# Patient Record
Sex: MALE | Race: WHITE | Employment: FULL TIME | URBAN - METROPOLITAN AREA
[De-identification: names, ages, dates, MRNs, and addresses within clinical notes are randomized per-mention and may not be internally consistent; named-entity substitution may affect disease eponyms.]

---

## 2019-02-18 ENCOUNTER — HOSPITAL ENCOUNTER (OUTPATIENT)
Dept: ULTRASOUND IMAGING | Age: 49
Discharge: HOME | End: 2019-02-18
Attending: ORTHOPAEDIC SURGERY
Payer: COMMERCIAL

## 2019-02-18 DIAGNOSIS — M79.662 PAIN OF LEFT CALF: ICD-10-CM

## 2019-02-18 PROCEDURE — 93971 EXTREMITY STUDY: CPT

## 2022-10-13 ENCOUNTER — OFFICE VISIT (OUTPATIENT)
Dept: ORTHOPEDIC SURGERY | Age: 52
End: 2022-10-13
Payer: COMMERCIAL

## 2022-10-13 VITALS — HEIGHT: 69 IN | BODY MASS INDEX: 31.1 KG/M2 | WEIGHT: 210 LBS

## 2022-10-13 DIAGNOSIS — M19.172 POST-TRAUMATIC OSTEOARTHRITIS OF LEFT ANKLE: Primary | ICD-10-CM

## 2022-10-13 DIAGNOSIS — M25.372 ANKLE INSTABILITY, LEFT: ICD-10-CM

## 2022-10-13 DIAGNOSIS — M25.572 ACUTE LEFT ANKLE PAIN: ICD-10-CM

## 2022-10-13 DIAGNOSIS — M21.172 ACQUIRED VARUS DEFORMITY OF LEFT ANKLE: ICD-10-CM

## 2022-10-13 PROCEDURE — 99203 OFFICE O/P NEW LOW 30 MIN: CPT | Performed by: ORTHOPAEDIC SURGERY

## 2022-10-13 NOTE — PROGRESS NOTES
Keturah Lopez (: 1970) is a 47 y.o. male, patient,here for evaluation of the following   Chief Complaint   Patient presents with    Ankle Pain        ASSESSMENT/PLAN:  Below is the assessment and plan developed based on review of pertinent history, physical exam, labs, studies, and medications. 1. Post-traumatic osteoarthritis of left ankle  -     REFERRAL TO DME  -     MRI ANKLE LT WO CONT; Future  2. Ankle instability, left  3. Acquired varus deformity of left ankle  -     REFERRAL TO DME  -     MRI ANKLE LT WO CONT; Future  4. Acute left ankle pain  -     XR STANDING ANKLE LT MIN 3 V; Future  -     XR ANKLE STRESS V LT; Future  -     MRI ANKLE LT WO CONT; Future    Patient has instability to the ankle as there is over time the development of varus ankle position, we discussed the treatments for this persistent problem that is becoming a chronic problem for the ankle as the ankle has a tendency towards varus. He does not correct too much with the stress evaluation into neutral position, so this deformity is more rigid. He has increased space at the lateral aspect indicating significant lateral instability. Patient is informed of neck step in treatment which is to obtain some other studies to further evaluate this. Left ankle MRI without contrast is ordered today. A CT scan may also be necessary if he elects to proceed with surgery for preop planning. In the meantime we will use a soft brace for activities as tolerated. No rays needed next time. Return for Review MRI when completed treatment as indicated. Not on File    No current outpatient medications on file. No current facility-administered medications for this visit. History reviewed. No pertinent past medical history. History reviewed. No pertinent surgical history. No family history on file.     Social History     Socioeconomic History    Marital status:      Spouse name: Not on file    Number of children: Not on file    Years of education: Not on file    Highest education level: Not on file   Occupational History    Not on file   Tobacco Use    Smoking status: Not on file    Smokeless tobacco: Not on file   Substance and Sexual Activity    Alcohol use: Not on file    Drug use: Not on file    Sexual activity: Not on file   Other Topics Concern    Not on file   Social History Narrative    Not on file     Social Determinants of Health     Financial Resource Strain: Not on file   Food Insecurity: Not on file   Transportation Needs: Not on file   Physical Activity: Not on file   Stress: Not on file   Social Connections: Not on file   Intimate Partner Violence: Not on file   Housing Stability: Not on file           Vitals:  Ht 5' 9\" (1.753 m)   Wt 210 lb (95.3 kg)   BMI 31.01 kg/m²    Body mass index is 31.01 kg/m². SUBJECTIVE:  Mercedes Calle (: 1970)   Patient presents today with complaint of ankle pain for the past 3 months and there is some swelling present. States now his foot is also hurting. Patient has had no broken bones he can recall but he has had many sprains. In  he felt he had almost twisted his entire ankle to 90 degrees and had a lot of pain at that time. Over the years he has had minor sprains and he did have swelling. He has had histories of Planter fasciitis as well. Current pain is mild to moderate sharp stabbing pain that comes and goes and sometimes interferes with sleep. There is swelling present. Bending, twisting, squatting, stairs/steps, running walking make it worse. Patient states symptoms unchanged getting worse and he is tried rest.  He is also used a boot in the past.  He is not diabetic, non-smoker. OBJECTIVE EXAM:     Visit Vitals  Ht 5' 9\" (1.753 m)   Wt 210 lb (95.3 kg)   BMI 31.01 kg/m²       Appearance: Alert, well appearing and pleasant patient who is in no distress, oriented to person, place/time, and who follows commands.  This patient is accompanied in the       office by his  self. Psychiatric: Affect and mood are appropriate. No dementia noted on examination  Musculoskeletal:  LOCATION: Discomfort around the ankle joint area leg - left      Integumentary: No rashes, skin patches, wounds, or abrasions to the right or left legs       Warm and normal color. No regions of expressible drainage. Gait: Normal      Tenderness: No tenderness        Motor/Strength/Tone Exam: Normal       Sensory Exam:   Intact Normal Sensation to ankle/foot      Stability Testing: No anterolateral or varus instability of the Ankle or Subtalar Joints               No peroneal tendon instability noted      ROM: Normal ROM noted to ankle/foot      Contractures: No Achilles or Gastrocnemius Contractures      Calf tenderness: Absent for calf or gastrocnemius muscle regions       Soft, supple, non tender, non taut lower extremity compartment  Alignment:      NEUTRAL Hindfoot,         none Metatarsus Adductus Metatarsus   Wounds/Abrasions:    None present  Extremities:   No embolic phenomena to the toes          No significant edema to the foot and or toes. Lower extremities are warm and appear well perfused    DVT: No evidence of DVT seen on examination at this time     No calf swelling, no tenderness to calf muscles  Lymphatic:  No Evidence of Lymphedema  Vascular: Medial Border of Tibia Region: Edema is not present         Pulses: Dorsalis Pedis &  Posterior Tibial Pulses : Palpable yes        Varicosities Lower Limbs :  None  noted  Neuro: Negative bilateral Straight leg raise (seated position)    See Musculoskeletal section for pertinent individual extremity examination    No abnormal hand/wrist, foot/ankle, or facial/neck tremors. Lower Extremity/Ankle/Foot:  Antalgic gait, satisfactory weightbearing stance. Left lower extremity/ankle:  There is tenderness to palpation around the ankle joint area, there is a tendency towards varus at the ankle but the hindfoot is neutral position, there is limited range of motion with dorsiflexion plantarflexion. The ankle is unstable for lateral talar tilt stress, anterior drawer is 1+. There is also mild tenderness along the peroneal tendons. Achilles tendon intact with a negative Torres test, negative ankle squeeze test.    Left foot: There is no malalignment or deformity, nontender, no swelling, ligaments grossly stable. Contralateral lower extremity/ankle /foot exam:  Nontender, no swelling ligaments grossly stable. Normal weightbearing stance. Neurovascular exam is intact for light touch sensation, cap refill, flexion/extension tone ankle strength 5/5. Imaging:  XR Results (maximum last 2): Results from Appointment encounter on 10/13/22    XR ANKLE STRESS V LT    Narrative  Stress x-rays of the left ankle. Contralateral show on the patient's chart shows increased instability lateral talus, correction medially there is some stiffness of the joint not recreating a full correction of joint. In comparison the right ankle also has some instability. XR STANDING ANKLE LT MIN 3 V    Narrative  Left ankle AP, lateral and oblique standing x-rays show varus position to the talus with arthritis at the joint, on lateral view shows the same, there is anterior bone spur at the talar neck indicating instability. An electronic signature was used to authenticate this note.   -- Nima Tobias MD

## 2022-10-13 NOTE — LETTER
10/17/2022    Patient: Marvin Buckner   YOB: 1970   Date of Visit: 10/13/2022     Nanette Rooney MD  [address] [address]  Via Fax: [phone]    Dear Nanette Rooney MD,      Thank you for referring Mr. Marvin Buckner to [facility] for evaluation. My notes for this consultation are attached. If you have questions, please do not hesitate to call me. I look forward to following your patient along with you.       Sincerely,    Humaira Parks MD

## 2022-10-25 ENCOUNTER — OFFICE VISIT (OUTPATIENT)
Dept: ORTHOPEDIC SURGERY | Age: 52
End: 2022-10-25
Payer: COMMERCIAL

## 2022-10-25 VITALS — BODY MASS INDEX: 31.1 KG/M2 | WEIGHT: 210 LBS | HEIGHT: 69 IN

## 2022-10-25 DIAGNOSIS — M21.172 ACQUIRED VARUS DEFORMITY OF LEFT ANKLE: ICD-10-CM

## 2022-10-25 DIAGNOSIS — M19.172 POST-TRAUMATIC OSTEOARTHRITIS OF LEFT ANKLE: Primary | ICD-10-CM

## 2022-10-25 DIAGNOSIS — M25.572 ACUTE LEFT ANKLE PAIN: ICD-10-CM

## 2022-10-25 DIAGNOSIS — M25.372 ANKLE INSTABILITY, LEFT: ICD-10-CM

## 2022-10-25 PROCEDURE — 99213 OFFICE O/P EST LOW 20 MIN: CPT | Performed by: ORTHOPAEDIC SURGERY

## 2022-10-25 NOTE — LETTER
10/31/2022    Patient: Marycruz Clarke   YOB: 1970   Date of Visit: 10/25/2022     Marisol Handley MD  [address] [facility] [place] [address]  Via Fax: [phone]    Dear Marisol Handley MD,      Thank you for referring Mr. Marycruz Clarke to [facility] for evaluation. My notes for this consultation are attached. If you have questions, please do not hesitate to call me. I look forward to following your patient along with you.       Sincerely,    Philly Bautista MD

## 2022-10-25 NOTE — PROGRESS NOTES
Kalani Ornelas (: 1970) is a 47 y.o. male, patient,here for evaluation of the following   Chief Complaint   Patient presents with    Ankle Pain    Follow-up        ASSESSMENT/PLAN:  Below is the assessment and plan developed based on review of pertinent history, physical exam, labs, studies, and medications. 1. Post-traumatic osteoarthritis of left ankle  2. Acquired varus deformity of left ankle  3. Ankle instability, left  4. Acute left ankle pain    Patient is informed of findings on the MRI obtained, he does have moderate tibiotalar arthritis most at the medial joint articulation and less at the lateral, not unusual be due to the underlying acquired varus deformity. We had discussed the possibility of addressing the acquired deformity first prior to addressing the arthritis. I would likely address arthritis initially with arthroscopic debridement and lateral ligament reconstruction with tendon graft was what I recommended. After further discussion he had many questions and all were answered, I also offered maybe another opinion so he can get better watch clear information about what to expect. Somewhere down the line he may be candidate for an ankle replacement but the deformity would have to be addressed prior to doing that, some of that could also be based on the cuts made but the lateral ligament should be stable for an ankle replacement in the future. I did provide a name of another orthopedic foot and ankle specialist to get another opinion from, patient will decide what he wants to do going forward, in the meantime recommend activity modification, brace if needed to avoid recurrent lateral sprains or inversion injuries. Return if symptoms worsen or fail to improve. Not on File    No current outpatient medications on file. No current facility-administered medications for this visit. No past medical history on file. No past surgical history on file.     No family history on file.    Social History     Socioeconomic History    Marital status:      Spouse name: Not on file    Number of children: Not on file    Years of education: Not on file    Highest education level: Not on file   Occupational History    Not on file   Tobacco Use    Smoking status: Not on file    Smokeless tobacco: Not on file   Substance and Sexual Activity    Alcohol use: Not on file    Drug use: Not on file    Sexual activity: Not on file   Other Topics Concern    Not on file   Social History Narrative    Not on file     Social Determinants of Health     Financial Resource Strain: Not on file   Food Insecurity: Not on file   Transportation Needs: Not on file   Physical Activity: Not on file   Stress: Not on file   Social Connections: Not on file   Intimate Partner Violence: Not on file   Housing Stability: Not on file           Vitals:  Ht 5' 9\" (1.753 m)   Wt 210 lb (95.3 kg)   BMI 31.01 kg/m²    Body mass index is 31.01 kg/m². SUBJECTIVE:  Troy Guzman (: 1970)   Patient back today to review the MRI without contrast obtained to evaluate the amount of arthritis at the joint and also for evaluation of ligaments, there is lateral instability with acquired hindfoot varus and arthritis at the joints. No new complaints. OBJECTIVE EXAM:     Visit Vitals  Ht 5' 9\" (1.753 m)   Wt 210 lb (95.3 kg)   BMI 31.01 kg/m²       Appearance: Alert, well appearing and pleasant patient who is in no distress, oriented to person, place/time, and who follows commands. This patient is accompanied in the       office by his  self. Psychiatric: Affect and mood are appropriate. No dementia noted on examination  Musculoskeletal:  LOCATION: Diffuse tenderness to palpation lateral and anterior medial ankle - left      Integumentary: No rashes, skin patches, wounds, or abrasions to the right or left legs       Warm and normal color. No regions of expressible drainage.       Gait: Normal      Tenderness: No tenderness        Motor/Strength/Tone Exam: Normal       Sensory Exam:   Intact Normal Sensation to ankle/foot      Stability Testing: No anterolateral or varus instability of the Ankle or Subtalar Joints               No peroneal tendon instability noted      ROM: Normal ROM noted to ankle/foot      Contractures: No Achilles or Gastrocnemius Contractures      Calf tenderness: Absent for calf or gastrocnemius muscle regions       Soft, supple, non tender, non taut lower extremity compartment  Alignment:      NEUTRAL Hindfoot,         none Metatarsus Adductus Metatarsus   Wounds/Abrasions:    None present  Extremities:   No embolic phenomena to the toes          No significant edema to the foot and or toes. Lower extremities are warm and appear well perfused    DVT: No evidence of DVT seen on examination at this time     No calf swelling, no tenderness to calf muscles  Lymphatic:  No Evidence of Lymphedema  Vascular: Medial Border of Tibia Region: Edema is not present         Pulses: Dorsalis Pedis &  Posterior Tibial Pulses : Palpable yes        Varicosities Lower Limbs :  None  noted  Neuro: Negative bilateral Straight leg raise (seated position)    See Musculoskeletal section for pertinent individual extremity examination    No abnormal hand/wrist, foot/ankle, or facial/neck tremors. Lower Extremity/Ankle/Foot:  Mild antalgic gait, satisfactory weightbearing stance. Left lower extremity/ankle: There is acquired varus position to the ankle unchanged, tenderness to palpation anterior medial ankle joint, lateral around the lateral ligaments, 1+ anterior drawer. Achilles tendon intact, negative Torres test, negative ankle squeeze test.  There is some stiffness to attempted dorsiflexion and plantarflexion, secondary arthritis    Left foot: Hindfoot position secondary to ankle varus deformity, otherwise mostly nontender, no swelling, ligament stable.     Contralateral lower extremity/ankle /foot exam: Nontender, no swelling ligaments grossly stable. Normal weightbearing stance. Neurovascular exam grossly intact. Imaging:      No x-rays were obtained today, reviewed MRI without contrast dated October 19, 2022, this is reviewed in detail and it shows the tenosynovitis of the posterior tibial tendon, mild also at the peroneal tendons. The lateral ligaments appear thick and irregular late related to old injuries. There is also a partial tear of the deltoid ligament which is a chronic problem. There is moderate tibiotalar osteoarthritis most at the medial aspect. There is slight internal rotation of the talus as it sits on the tibial plafond. Detailed radiology report is also reviewed, the summary below. IMPRESSION  1. Moderate tibiotalar osteoarthritis with greater changes medially. There is  internal rotation of the talus in relation to the tibial plafond. 2. Significant thickening and irregularity of the ligaments of the lateral side  the ankle likely related to prior injury/degeneration. 3. Partial tearing of the deltoid ligament complex which may be chronic. There  is a synovial cyst that extends anterior medially from the deltoid ligament  complex. 4. Mild posterior tibial tenosynovitis. 5. Mild peroneal tenosynovitis. 6. Moderate osteoarthritis in the midfoot. An electronic signature was used to authenticate this note.   -- Henry Gleason MD

## 2023-01-26 ENCOUNTER — OFFICE VISIT (OUTPATIENT)
Dept: ORTHOPEDIC SURGERY | Age: 53
End: 2023-01-26
Payer: COMMERCIAL

## 2023-01-26 DIAGNOSIS — M25.572 ACUTE LEFT ANKLE PAIN: ICD-10-CM

## 2023-01-26 DIAGNOSIS — M25.372 ANKLE INSTABILITY, LEFT: Primary | ICD-10-CM

## 2023-01-26 NOTE — PROGRESS NOTES
Ariela Gee (: 1970) is a 47 y.o. male patient here for evaluation of the following chief complaint(s):  No chief complaint on file. Patient Name: Ariela Gee  Date:2023  : 1970  [x]  Patient  Verified  Payor: Harper Mccray / Plan: [facility] HMO/CHOICE PLUS/POS / Product Type: HMO /    In time: 2:05  Out time:3:05  Total Treatment Time (min): 60  Total Timed Codes (min): 50  1:1 Treatment Time (MC only): NA   Visit #: 1 of 60      SUBJECTIVE/OBJECTIVE:  HPI    The patient is a 47 year male presenting with left ankle pain and swelling, instability. Recurring symptoms since . The patient is referred by Naida Long MD.   The patient has a longstanding history of left ankle pain, torn deltoid ligament and lateral ligaments, and osteoarthritis/bone spur. He had all of this repaired on 2022. Surgeon Dr. Beth Mitchell at [facility]. Last week he was taken out of the cast and has since been wearing a walking boot, he uses crutches for support. His next follow-up appoint with the surgeon is . He does have intermittent pain and quite a bit of stiffness since the operation. 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 His long-term goals are to get back to biking on the road, hiking, and coaching his baseball team.  He has 2 daughters. Physical Exam    Ankle Exam:    Range of motion: on left  Dorsiflexion- -10 degrees  Plantarflexion-  50 degrees  Subtalar inversion/Eversion- limited  Great toe ext- limited    *will assess range on right next visit    Strength: on right  DF- 5/5  PF- 5/5  Inv/Ev- 5/5    Strength: on left  DF- 5/5  PF- 5/5  Inv/Ev- 5/5    Calf raises- will assess once appropriate  Squatting- NT today  SL balance- NT today    Flexibility:   Hamstring- moderately restricted bilaterally  Gastroc-soleus-  moderately restricted bilaterally    Soft tissue:  Moderate restriction is noted of the gastroc-soleus complex, plantar fascia. Joint mobility:  TC joint- hypomobile, into ankle dorsiflexion, plantarflexion inversion and eversion  Subtalar joint- hypomobile into subtalar eversion  Midfoot-stiffness, swelling present  1st MTP-mild valgus, mild limitation into extension    Pain: Reported a visual analog scale:  0-3/10;      Gait: patient is currently wearing a walking boot with use of crutches for support    FAAM: 30%      Therapeutic exercise performed: Seated slides into dorsiflexion and eversion, plantarflexion stretch, calf stretch, calf massage, and gait practice. 20 min    Manual: Gastrocsoleus STM, AP glides, stretching into eversion dorsiflexion and plantarflexion. 10 min. Game ready ice machine for 10 minutes. An electronic signature was used to authenticate this note. -- Pool Ceja, PT       ASSESSMENT/PLAN:  Below is the assessment and plan developed based on review of pertinent history, physical exam, labs, studies, and medications. 1. Ankle instability, left  2. Acute left ankle pain      No follow-ups on file. Ky Lopez is a 47 y.o. M presenting status post deltoid ligament and ATFL repair on 12/8/2022. He currently has the following impairments:  moderately restricted gastroc-soleus complex and plantar fascia on the left, pain with palpation of the Achilles/gastroc region on the left, increased pain with walking, standing, balancing, and squatting; loss of great toe mobility and ankle dorsiflexion range of motion on the left; inability to rise on his toes; and inability to perform daily and recreational activity including distance walking. He would benefit from skilled physical therapy services in order to address the above impairments to allow for return to daily and recreational activity. We reviewed a home program and provided a print out. The patient verbalized understanding of the home program and POC.  The patient will follow up in the clinic 2x/week for 4-6 weeks to address the above impairments and work towards a home program.      Goals  to achieve in 4-6 weeks. 1.  Increase ankle dorsiflexion by 10-15°, bilaterally, to aid in walking and stair ability  2. Increase tolerance to single leg balancing with performance of single-leg stance for greater than 10-20 seconds without pain/instability  3. The patient will be able to walk without the boot for 20 minutes, proper gait mechanics  4. The patient will be able to walk up and down 1 flight of stairs with adequate dorsiflexion mobility  5. The patient will be independent with a home exercise program  6. The patient will be able to rise up on his toes      An electronic signature was used to authenticate this note.   -- Susie Lieberman, PT

## 2023-01-31 ENCOUNTER — OFFICE VISIT (OUTPATIENT)
Dept: ORTHOPEDIC SURGERY | Age: 53
End: 2023-01-31
Payer: COMMERCIAL

## 2023-01-31 DIAGNOSIS — M25.372 ANKLE INSTABILITY, LEFT: ICD-10-CM

## 2023-01-31 DIAGNOSIS — M25.572 ACUTE LEFT ANKLE PAIN: Primary | ICD-10-CM

## 2023-01-31 PROCEDURE — 97110 THERAPEUTIC EXERCISES: CPT | Performed by: PHYSICAL THERAPIST

## 2023-01-31 PROCEDURE — 97140 MANUAL THERAPY 1/> REGIONS: CPT | Performed by: PHYSICAL THERAPIST

## 2023-02-03 ENCOUNTER — OFFICE VISIT (OUTPATIENT)
Dept: ORTHOPEDIC SURGERY | Age: 53
End: 2023-02-03
Payer: COMMERCIAL

## 2023-02-03 DIAGNOSIS — M25.572 ACUTE LEFT ANKLE PAIN: Primary | ICD-10-CM

## 2023-02-03 DIAGNOSIS — M25.372 ANKLE INSTABILITY, LEFT: ICD-10-CM

## 2023-02-03 NOTE — PROGRESS NOTES
Ismael George (: 1970) is a 47 y.o. male patient here for evaluation of the following chief complaint(s): Ankle Pain       Patient Name: Ismael George  Date:2/3/2023  : 1970  [x]  Patient  Verified  Payor: Michaele Fabry [facility] / Product Type: HMO /    In time: 1:20  Out time: 2:35  Total Treatment Time (min): 75  Total Timed Codes (min): 60  1:1 Treatment Time (MC only): NA   Visit #: 2 of 60      ASSESSMENT/PLAN:  Below is the assessment and plan developed based on review of pertinent history, physical exam, labs, studies, and medications. 1. Acute left ankle pain  2. Ankle instability, left    Return in about 1 week (around 2/10/2023). Patient is now almost 2 months from operation. Surgery on 2022. We are continuing to progress his range of motion, he is quite limited into ankle dorsiflexion and inversion range. Instructed on updated exercises to work on daily, he will follow-up next week and we will continue to progress out of the walking boot once able. SUBJECTIVE/OBJECTIVE:  HPI    Patient reports an improvement since doing the exercises. His ankle is stiff with movements. He is feeling better walking without the crutches in his walking boot. Physical Exam    Objective:    Range of motion: on left  Dorsiflexion- almost neutral  Plantarflexion-  50 degrees  Subtalar inversion/Eversion- limited  Great toe ext- limited    Function/Strength: improvement in tolerance to ankle dorsiflexion range. Able to walk in the boot without crutches today. Manual performed: Left ankle mobilization into dorsiflexion, plantarflexion and subtalar eversion; soft tissue release gastrocsoleus and anterior shin. Great toe stretch and massage. 20 min.     Therapeutic exercise performed: Knee to wall stretch, gait practice, seated slides into inversion, eversion, and dorsiflexion, calf stretch, plantarflexion stretch, toe stretch, subtalar mobilization, band plantarflexion, DF, and eversion. 40 minutes. Patient finished with the Game ready for 15 minutes. An electronic signature was used to authenticate this note.   -- Harinder Dallas, PT

## 2023-02-07 ENCOUNTER — OFFICE VISIT (OUTPATIENT)
Dept: ORTHOPEDIC SURGERY | Age: 53
End: 2023-02-07
Payer: COMMERCIAL

## 2023-02-07 DIAGNOSIS — M25.572 ACUTE LEFT ANKLE PAIN: Primary | ICD-10-CM

## 2023-02-07 DIAGNOSIS — M25.372 ANKLE INSTABILITY, LEFT: ICD-10-CM

## 2023-02-07 PROCEDURE — 97110 THERAPEUTIC EXERCISES: CPT | Performed by: PHYSICAL THERAPIST

## 2023-02-07 PROCEDURE — 97140 MANUAL THERAPY 1/> REGIONS: CPT | Performed by: PHYSICAL THERAPIST

## 2023-02-07 NOTE — PROGRESS NOTES
Siria Tyler (: 1970) is a 47 y.o. male patient here for evaluation of the following chief complaint(s): Ankle Pain       Patient Name: Siria Tyler  Date:2023  : 1970  [x]  Patient  Verified  Payor: Elias Maciel / [facility] / Product Type: HMO /    In time: 2:20  Out time: 3:30  Total Treatment Time (min): 70  Total Timed Codes (min): 60  1:1 Treatment Time (MC only): NA   Visit #: 3  60      ASSESSMENT/PLAN:  Below is the assessment and plan developed based on review of pertinent history, physical exam, labs, studies, and medications. 1. Acute left ankle pain  2. Ankle instability, left    Return in about 2 days (around 2023). Patient is now 2 months from operation. Surgery on 2022. We are continuing to progress his range of motion, he is quite limited into ankle dorsiflexion range. Today he was able to progress out of the walking boot to practice his gait mechanics. Uses a crutch as needed for balance. Instructed on updated exercises to work on daily, he will follow-up next week and we will continue to progress towards full range and proper gait mechanics. SUBJECTIVE/OBJECTIVE:  HPI    Patient reports he continues to work on his range of motion at home. Feels that the boot is limiting his range. Physical Exam    Objective:    Range of motion : on left  Dorsiflexion- slightly past neutral   Plantarflexion-  50 degrees  Subtalar inversion/Eversion- limited  Great toe ext- limited    Function/Strength: improvement in tolerance to ankle dorsiflexion range. Progressed to practicing gait without the boot. Manual performed: Left ankle mobilization into dorsiflexion, plantarflexion and subtalar eversion; soft tissue release gastrocsoleus and anterior shin. Great toe stretch and massage. Ankle mob into DF in 1/2 kneeling. 20 min.     Therapeutic exercise performed: Knee to wall stretch, gait practice/cone walks, seated slides into inv/ev/DF, calf stretch, plantarflexion stretch, toe stretch, subtalar mobilization, band 3 way, ankle mob DF with band. 40 minutes. Patient finished with the Game ready for 10 minutes. An electronic signature was used to authenticate this note.   -- Ho Cyr, PT

## 2023-02-09 ENCOUNTER — OFFICE VISIT (OUTPATIENT)
Dept: ORTHOPEDIC SURGERY | Age: 53
End: 2023-02-09
Payer: COMMERCIAL

## 2023-02-09 DIAGNOSIS — M25.372 ANKLE INSTABILITY, LEFT: ICD-10-CM

## 2023-02-09 DIAGNOSIS — M25.572 ACUTE LEFT ANKLE PAIN: Primary | ICD-10-CM

## 2023-02-09 NOTE — PROGRESS NOTES
Darnell Munoz (: 1970) is a 47 y.o. male patient here for evaluation of the following chief complaint(s): Ankle Pain       Patient Name: Darnell Munoz  Date:2023  : 1970  [x]  Patient  Verified  Payor: Angi Centenojazzy / Plan: [address] HMO/CHOICE PLUS/POS / Product Type: HMO /    In time: 3:40  Out time: 4:55  Total Treatment Time (min): 75  Total Timed Codes (min): 60  1:1 Treatment Time (MC only): NA   Visit #: 4  60      ASSESSMENT/PLAN:  Below is the assessment and plan developed based on review of pertinent history, physical exam, labs, studies, and medications. 1. Acute left ankle pain  2. Ankle instability, left    Return in about 5 days (around 2023). Patient is now 2 months from operation. Surgery on 2022. We are continuing to progress his range of motion, he is quite limited into ankle dorsiflexion range. Today he was able to progress out of the walking boot to practice his gait mechanics, he is improving with this. Uses a crutch as needed for balance. Instructed on updated exercises to work on daily, he will follow-up next week and we will continue to progress towards full range and proper gait mechanics. SUBJECTIVE/OBJECTIVE:  HPI    Patient reports he continues to work on his range of motion at home and while at work. He is doing well practicing his gait out of the boot. Physical Exam    Objective:    Range of motion : on left  Dorsiflexion- slightly past neutral   Plantarflexion-  50 degrees  Subtalar inversion/Eversion- limited  Great toe ext- limited    Function/Strength: improvement in tolerance to ankle dorsiflexion range. Progressed to practicing gait without the boot. Manual performed: Left ankle mobilization into dorsiflexion, plantarflexion and subtalar eversion; soft tissue release gastrocsoleus and anterior shin. Great toe stretch and massage. 20 min.     Therapeutic exercise performed: Knee to wall stretch, gait practice/cone walks, seated slides into inv/ev/DF, calf stretch, calf raises, squats, subtalar mobilization, band 3 way, ankle mob DF with band. 40 minutes. Patient finished with the Game ready for 10 minutes. An electronic signature was used to authenticate this note.   -- Trice Gonzales, PT

## 2023-02-13 ENCOUNTER — OFFICE VISIT (OUTPATIENT)
Dept: ORTHOPEDIC SURGERY | Age: 53
End: 2023-02-13
Payer: COMMERCIAL

## 2023-02-13 DIAGNOSIS — M25.572 ACUTE LEFT ANKLE PAIN: Primary | ICD-10-CM

## 2023-02-13 DIAGNOSIS — M25.372 ANKLE INSTABILITY, LEFT: ICD-10-CM

## 2023-02-13 PROCEDURE — 97140 MANUAL THERAPY 1/> REGIONS: CPT | Performed by: PHYSICAL THERAPIST

## 2023-02-13 PROCEDURE — 97110 THERAPEUTIC EXERCISES: CPT | Performed by: PHYSICAL THERAPIST

## 2023-02-13 NOTE — PROGRESS NOTES
Dayne Ponce (: 1970) is a 47 y.o. male patient here for evaluation of the following chief complaint(s): Ankle Pain       Patient Name: Dayne Ponce  Date:2023  : 1970  [x]  Patient  Verified  Payor: Jammie Aviles / Plan: [id number] HMO/CHOICE PLUS/POS / Product Type: HMO /    In time: 2:00  Out time: 3:15  Total Treatment Time (min): 75  Total Timed Codes (min): 60  1:1 Treatment Time (MC only): NA   Visit #: 5 of 60      ASSESSMENT/PLAN:  Below is the assessment and plan developed based on review of pertinent history, physical exam, labs, studies, and medications. 1. Acute left ankle pain  2. Ankle instability, left    Return in about 3 days (around 2023). Patient is now over 2 months from operation. Surgery on 2022. We are continuing to progress his range of motion, he is quite limited into ankle dorsiflexion range. Today we added lateral step downs for DF range. Instructed on updated exercises to work on daily, he will follow-up next week and we will continue to progress towards full range and proper gait mechanics. SUBJECTIVE/OBJECTIVE:  HPI    Patient reports he continues to work on his range of motion at home. He's been practicing his gait out of the boot. Physical Exam    Objective:    Range of motion : on left  Dorsiflexion- slightly past neutral   Plantarflexion-  50 degrees  Subtalar inversion/Eversion- limited  Great toe ext- limited    Function/Strength: improvement in tolerance to ankle dorsiflexion range, gait mechanics     Manual performed: Left ankle mobilization into dorsiflexion, plantarflexion and subtalar eversion; soft tissue release gastrocsoleus and anterior shin. Great toe stretch and massage. 20 min.     Therapeutic exercise performed: Knee to wall stretch, gait practice/cone walks, seated slides into inv/ev/DF, calf stretch, calf raises, squats, subtalar mobilization, lateral heel taps, ankle distraction, ankle mob DF with band. 40 minutes. Patient finished with ice for 10 minutes. An electronic signature was used to authenticate this note.   -- Yolanda Damon, PT

## 2023-02-16 ENCOUNTER — OFFICE VISIT (OUTPATIENT)
Dept: ORTHOPEDIC SURGERY | Age: 53
End: 2023-02-16
Payer: COMMERCIAL

## 2023-02-16 DIAGNOSIS — M25.572 ACUTE LEFT ANKLE PAIN: Primary | ICD-10-CM

## 2023-02-16 DIAGNOSIS — M25.372 ANKLE INSTABILITY, LEFT: ICD-10-CM

## 2023-02-16 NOTE — PROGRESS NOTES
Perry Zhao (: 1970) is a 47 y.o. male patient here for evaluation of the following chief complaint(s): Ankle Pain       Patient Name: Perry Zhao  Date:2023  : 1970  [x]  Patient  Verified  Payor: Kathe Fuelling / Plan: [employer] HMO/CHOICE PLUS/POS / Product Type: HMO /    In time: 3:00  Out time: 4:15  Total Treatment Time (min): 75  Total Timed Codes (min): 60  1:1 Treatment Time (MC only): NA   Visit #: 6  60      ASSESSMENT/PLAN:  Below is the assessment and plan developed based on review of pertinent history, physical exam, labs, studies, and medications. 1. Acute left ankle pain  2. Ankle instability, left    Return in about 4 days (around 2023). Patient is now over 2 months from operation. Surgery on 2022. We are continuing to progress his range of motion, he is quite limited into ankle dorsiflexion range. Today we added lateral and forward step downs for DF range. Instructed on updated exercises to work on daily, he will follow-up next week and we will continue to progress towards full range and proper gait mechanics. 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 Slow progress. Physical Exam    Objective:    Range of motion /7: on left  Dorsiflexion- slightly past neutral   Plantarflexion-  50 degrees  Subtalar inversion/Eversion- limited  Great toe ext- limited    Function/Strength: improvement in tolerance to ankle dorsiflexion range, gait mechanics     Manual performed: Left ankle mobilization into dorsiflexion, plantarflexion and subtalar eversion; soft tissue release gastrocsoleus and anterior shin. Great toe stretch. 20 min.     Therapeutic exercise performed: Knee to wall stretch, gait practice/cone walks, seated slides into inv/ev/DF, calf stretch, squats, subtalar mobilization, lateral and forward heel taps, ankle distraction, ankle mob DF with band. 40 minutes. Patient finished with Game ready for 10 minutes. An electronic signature was used to authenticate this note.   -- Chung Balbuena, PT

## 2023-02-20 ENCOUNTER — OFFICE VISIT (OUTPATIENT)
Dept: ORTHOPEDIC SURGERY | Age: 53
End: 2023-02-20
Payer: COMMERCIAL

## 2023-02-20 DIAGNOSIS — M25.372 ANKLE INSTABILITY, LEFT: ICD-10-CM

## 2023-02-20 DIAGNOSIS — M25.572 ACUTE LEFT ANKLE PAIN: Primary | ICD-10-CM

## 2023-02-20 PROCEDURE — 97110 THERAPEUTIC EXERCISES: CPT | Performed by: PHYSICAL THERAPIST

## 2023-02-20 PROCEDURE — 97140 MANUAL THERAPY 1/> REGIONS: CPT | Performed by: PHYSICAL THERAPIST

## 2023-02-23 ENCOUNTER — OFFICE VISIT (OUTPATIENT)
Dept: ORTHOPEDIC SURGERY | Age: 53
End: 2023-02-23
Payer: COMMERCIAL

## 2023-02-23 DIAGNOSIS — M25.572 ACUTE LEFT ANKLE PAIN: Primary | ICD-10-CM

## 2023-02-23 DIAGNOSIS — M25.372 ANKLE INSTABILITY, LEFT: ICD-10-CM

## 2023-02-23 NOTE — PROGRESS NOTES
Collette Molina (: 1970) is a 47 y.o. male patient here for evaluation of the following chief complaint(s): Ankle Pain       Patient Name: Collette Molina  Date:2023  : 1970  [x]  Patient  Verified  Payor: Kurt Ball / Plan: [address] [employer] HMO/CHOICE PLUS/POS / Product Type: HMO /    In time: 2:35  Out time: 3:50  Total Treatment Time (min): 75  Total Timed Codes (min): 60  1:1 Treatment Time (MC only): NA   Visit #: 10 of 60      ASSESSMENT/PLAN:  Below is the assessment and plan developed based on review of pertinent history, physical exam, labs, studies, and medications. 1. Acute left ankle pain  2. Ankle instability, left    Return in about 5 days (around 2023). Patient is now 2 1/2 months from operation. Surgery on 2022. We are continuing to progress his range of motion, he is still limited into ankle dorsiflexion range. His walking is improving each week. Instructed on updated exercises to work on daily, he will follow-up next week and we will continue to progress towards full range and proper gait mechanics. SUBJECTIVE/OBJECTIVE:  HPI    Patient reports his ankle was swollen starting Monday night. He is starting to  baseball but wears his walking boot. Physical Exam    Objective:    Range of motion : on left  Dorsiflexion- slightly past neutral   Plantarflexion-  50 degrees  Subtalar inversion/Eversion- limited  Great toe ext- limited    Function/Strength: improvement in tolerance to ankle dorsiflexion range, gait mechanics     Manual performed: Left ankle mobilization into dorsiflexion, plantarflexion and subtalar eversion; soft tissue release gastrocsoleus and anterior shin, ankle mob dorsiflexion in 1/2 kneeling. Great toe stretch. 20 min. Therapeutic exercise performed: Knee to wall stretch, calf raises, band walks, calf stretch, squats, ankle distraction, ankle mob DF and PF with band. 40 minutes.       Patient finished with Game ready for 10 minutes. An electronic signature was used to authenticate this note.   -- Hazel Villalobos, PT

## 2023-02-28 ENCOUNTER — OFFICE VISIT (OUTPATIENT)
Dept: ORTHOPEDIC SURGERY | Age: 53
End: 2023-02-28

## 2023-02-28 DIAGNOSIS — M25.572 ACUTE LEFT ANKLE PAIN: Primary | ICD-10-CM

## 2023-02-28 DIAGNOSIS — M25.372 ANKLE INSTABILITY, LEFT: ICD-10-CM

## 2023-02-28 NOTE — PROGRESS NOTES
Ben Lopez (: 1970) is a 47 y.o. male patient here for evaluation of the following chief complaint(s): Ankle Pain       Patient Name: Ben Lopez  Date:2023  : 1970  [x]  Patient  Verified  Payor: Jennifer Boy / Plan: [address] [employer] HMO/CHOICE PLUS/POS / Product Type: HMO /    In time: 2:10  Out time: 3:30  Total Treatment Time (min): 80  Total Timed Codes (min): 60  1:1 Treatment Time (MC only): NA   Visit #: 10 of 60      ASSESSMENT/PLAN:  Below is the assessment and plan developed based on review of pertinent history, physical exam, labs, studies, and medications. 1. Acute left ankle pain  2. Ankle instability, left    Return in about 2 days (around 3/2/2023). Patient is now 12 weeks from operation. Surgery on 2022. We are continuing to progress his range of motion, he is still limited into ankle dorsiflexion and plantarflexion range. His walking is improving each week and we are progressing to greater weight bearing activity including stairs, calf raises, and squats. Instructed on updated exercises to work on daily, he will follow-up later this week and we will continue to progress towards full range and proper gait mechanics. SUBJECTIVE/OBJECTIVE:  HPI    Patient reports the swelling went down since the last visit. He stretches daily. Physical Exam    Objective:    Range of motion : on left  KTW dorsiflexion- on right- 5 degrees  on left- 16 degrees  Plantarflexion-  not measured, improving  Ankle inversion/eversion- improving  Great toe ext- NT today    Function/Strength: improvement in tolerance to ankle dorsiflexion range, mild shift off the left LE in stance phase and mild loss of DF range in stance    Manual performed: Left ankle mobilization into dorsiflexion, plantarflexion and subtalar eversion; ankle distraction; soft tissue release gastrocsoleus and anterior shin, ankle mob dorsiflexion in 1/2 kneeling. Great toe stretch.  21 min.    Therapeutic exercise performed: Knee to wall stretch, calf raise shifts, band walks, calf stretch, squats, ankle distraction, plantarflexion stretch in sitting, lateral and forward heel taps. 45 minutes. Patient finished with Game ready for 10 minutes. An electronic signature was used to authenticate this note.   -- Radha Shook, PT

## 2023-03-08 ENCOUNTER — OFFICE VISIT (OUTPATIENT)
Dept: ORTHOPEDIC SURGERY | Age: 53
End: 2023-03-08

## 2023-03-08 DIAGNOSIS — M25.572 ACUTE LEFT ANKLE PAIN: Primary | ICD-10-CM

## 2023-03-08 DIAGNOSIS — M25.372 ANKLE INSTABILITY, LEFT: ICD-10-CM

## 2023-03-08 NOTE — PROGRESS NOTES
Gregorio Carreno (: 1970) is a 47 y.o. male patient here for evaluation of the following chief complaint(s): Ankle Pain       Patient Name: Gregorio Carreno  Date:3/8/2023  : 1970  [x]  Patient  Verified  Payor: Mario Barber / Plan: [address] [employer] HMO/CHOICE PLUS/POS / Product Type: HMO /    In time: 3:00  Out time: 4:20  Total Treatment Time (min): 80  Total Timed Codes (min): 60  1:1 Treatment Time ( only): NA   Visit #:       ASSESSMENT/PLAN:  Below is the assessment and plan developed based on review of pertinent history, physical exam, labs, studies, and medications. 1. Acute left ankle pain  2. Ankle instability, left    Return in about 1 week (around 3/15/2023). Patient is now 13 weeks from operation. Surgery on 2022. We are continuing to progress his range of motion, he is still limited into ankle dorsiflexion and plantarflexion range. His walking is improving each week but does lack DF and hip extension through stance. We are progressing to greater weight bearing activity including stairs, calf raises, and squats. Instructed on updated exercises to work on daily, he will follow-up next week and we will continue to progress towards full range and proper gait mechanics. SUBJECTIVE/OBJECTIVE:  HPI    Patient reports he saw his surgeon and had a good report. Follows up again in . The ankle swells if he's been on his feet for a while. 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     Physical Exam    Objective:    Range of motion : on left  KTW dorsiflexion- on right- 5 degrees  on left- 16 degrees  Plantarflexion-  not measured, improving  Ankle inversion/eversion- improving  Great toe ext- WFL    Function/Strength: improvement in tolerance to ankle dorsiflexion range,   Gait: mild shift off the left LE in stance phase, loss of DF range and hip extension in stance and terminal stance    Manual performed: Left ankle mobilization into dorsiflexion, plantarflexion and subtalar eversion; ankle distraction; soft tissue release gastrocsoleus and lateral shin. 20 min. Therapeutic exercise performed: Knee to wall stretch, calf raises, band walks, calf stretch, squats, ankle distraction, plantarflexion stretch in sitting, lateral and forward heel taps, deep squats, hip flexor stretching. 45 minutes. An electronic signature was used to authenticate this note.   -- Al Bangura, PT

## 2023-03-13 ENCOUNTER — OFFICE VISIT (OUTPATIENT)
Dept: ORTHOPEDIC SURGERY | Age: 53
End: 2023-03-13
Payer: COMMERCIAL

## 2023-03-13 DIAGNOSIS — M25.572 ACUTE LEFT ANKLE PAIN: Primary | ICD-10-CM

## 2023-03-13 DIAGNOSIS — M25.372 ANKLE INSTABILITY, LEFT: ICD-10-CM

## 2023-03-13 PROCEDURE — 97110 THERAPEUTIC EXERCISES: CPT | Performed by: PHYSICAL THERAPIST

## 2023-03-13 PROCEDURE — 97140 MANUAL THERAPY 1/> REGIONS: CPT | Performed by: PHYSICAL THERAPIST

## 2023-03-13 NOTE — PROGRESS NOTES
Missie Crigler (: 1970) is a 47 y.o. male patient here for evaluation of the following chief complaint(s): Ankle Pain       Patient Name: Missie Crigler  Date:3/14/2023  : 1970  [x]  Patient  Verified  Payor: Jay Jay Chol / Plan: [address] [employer] HMO/CHOICE PLUS/POS / Product Type: HMO /    In time: 1:40  Out time: 3:00  Total Treatment Time (min): 80  Total Timed Codes (min): 60  1:1 Treatment Time ( only): NA   Visit #:       ASSESSMENT/PLAN:  Below is the assessment and plan developed based on review of pertinent history, physical exam, labs, studies, and medications. 1. Acute left ankle pain  2. Ankle instability, left    Return in about 1 week (around 3/20/2023). Patient is now 14 weeks from operation. Surgery on 2022. We are continuing to progress his range of motion, he is still limited into ankle dorsiflexion and plantarflexion range. His walking is improving each week but continues to lack DF and hip extension through stance. We are progressing to greater weight bearing activity including stairs, calf raises, and squats. Instructed on updated exercises to work on daily, he will follow-up later this week. SUBJECTIVE/OBJECTIVE:  HPI    Patient has been practicing stairs at home and is working on his mobility. Physical Exam    Objective:    Range of motion : on left  KTW dorsiflexion- on right- 5 degrees  on left- 16 degrees  Plantarflexion-  not measured, improving  Ankle inversion/eversion- improving  Great toe ext- WFL    Function/Strength: improvement in tolerance to ankle dorsiflexion range,   Gait: mild shift off the left LE in stance phase, loss of DF range and hip extension in stance and terminal stance    Manual performed: Left ankle mobilization into dorsiflexion, plantarflexion and subtalar eversion; ankle distraction; soft tissue release gastrocsoleus and lateral shin in prone. 20 min.     Therapeutic exercise performed: Knee to wall stretch, calf raises, ladder drills/lateral movements, band walks, calf stretch, squats, ankle distraction, plantarflexion stretch in sitting, lateral and forward heel taps, deep squats, hip flexor stretching. 45 minutes. An electronic signature was used to authenticate this note.   -- Marcus Finn, PT

## 2023-03-15 ENCOUNTER — OFFICE VISIT (OUTPATIENT)
Dept: ORTHOPEDIC SURGERY | Age: 53
End: 2023-03-15

## 2023-03-15 DIAGNOSIS — M25.372 ANKLE INSTABILITY, LEFT: ICD-10-CM

## 2023-03-15 DIAGNOSIS — M25.572 ACUTE LEFT ANKLE PAIN: Primary | ICD-10-CM

## 2023-03-15 NOTE — PROGRESS NOTES
Dorothy Enrique (: 1970) is a 47 y.o. male patient here for evaluation of the following chief complaint(s): Ankle Pain       Patient Name: Dorothy Enrique  Date:3/15/2023  : 1970  [x]  Patient  Verified  Payor: Diego Octaviano / Plan: [employer] HMO/CHOICE PLUS/POS / Product Type: HMO /    In time: 1:40  Out time: 2:50  Total Treatment Time (min): 70  Total Timed Codes (min): 60  1:1 Treatment Time ( only): NA   Visit #:       ASSESSMENT/PLAN:  Below is the assessment and plan developed based on review of pertinent history, physical exam, labs, studies, and medications. 1. Acute left ankle pain  2. Ankle instability, left    Return in about 1 week (around 3/22/2023). Patient is now 14 weeks from operation. Surgery on 2022. We are continuing to progress his range of motion, he is still limited into ankle dorsiflexion and plantarflexion range and compensates through hip external rotation while walking. We progressed his hip mobility exercise and stretching to work on at home. We are progressing to greater weight bearing activity including stairs, calf raises, and squats. Instructed on updated exercises to work on daily, he will follow-up next week. SUBJECTIVE/OBJECTIVE:  HPI    Patient is concerned about his gait/compensation with hip external rotation on his left side. He feels that this has developed over the last few weeks as he has been more active    Physical Exam    Objective:    Range of motion : on left  KTW dorsiflexion- on right- 5 degrees  on left- 16 degrees  Plantarflexion-  not measured, improving  Ankle inversion/eversion- improving  Great toe ext- WFL    **Mild limitation into hip flexion, internal rotation on the left.     Function/Strength: improvement in tolerance to ankle dorsiflexion range,   Gait: mild shift off the left LE in stance phase, loss of DF range and demonstration of increased hip external rotation through stance    Manual performed: Left ankle mobilization into dorsiflexion, plantarflexion and subtalar eversion; soft tissue release gastrocsoleus; passive hip IR stretching. 20 min. Therapeutic exercise performed: Knee to wall stretch, calf raises, seated quad stretch, banded hip mobilization, mini knee bends, ankle distraction, plantarflexion stretch in sitting, lateral and forward heel taps, deep squats, hip flexor release with ball. 45 minutes. An electronic signature was used to authenticate this note.   -- Jewel Brown, PT

## 2023-03-21 ENCOUNTER — OFFICE VISIT (OUTPATIENT)
Dept: ORTHOPEDIC SURGERY | Age: 53
End: 2023-03-21

## 2023-03-21 DIAGNOSIS — M25.372 ANKLE INSTABILITY, LEFT: ICD-10-CM

## 2023-03-21 DIAGNOSIS — M25.572 ACUTE LEFT ANKLE PAIN: Primary | ICD-10-CM

## 2023-03-21 NOTE — PROGRESS NOTES
Darnell Munoz (: 1970) is a 47 y.o. male patient here for evaluation of the following chief complaint(s): Ankle Pain       Patient Name: Darnell Munoz  Date:3/21/2023  : 1970  [x]  Patient  Verified  Payor: Angi Centen[employer]azzy / Plan: [address] [employer] HMO/CHOICE PLUS/POS / Product Type: HMO /    In time: 2:20  Out time: 3:30  Total Treatment Time (min): 70  Total Timed Codes (min): 60  1:1 Treatment Time (MC only): NA   Visit #:       ASSESSMENT/PLAN:  Below is the assessment and plan developed based on review of pertinent history, physical exam, labs, studies, and medications. 1. Acute left ankle pain  2. Ankle instability, left    Return in about 2 days (around 3/23/2023). Patient is now 14 weeks from operation. Surgery on 2022. We are continuing to progress his range of motion, he is making some progress with his gait mechanics with demonstration of less ankle dorsiflexion and hip external rotation through stance phase. We have progressed his hip mobility exercise and stretching to work on at home. He is continuing to walk for longer durations outside of the clinic. He is performing balance and weight bearing activity including stairs, calf raises, and squats on his own at home. He will follow-up later this week. SUBJECTIVE/OBJECTIVE:  HPI    Patient is concerned about his gait/compensation with hip external rotation on his left side. He feels that this has developed over the last few weeks as he has been more active    Physical Exam    Objective:    Range of motion : on left  KTW dorsiflexion- on right- 5 degrees  on left- 16 degrees  Plantarflexion-  not measured, improving  Ankle inversion/eversion- improving  Great toe ext- WFL    **Mild limitation into hip flexion, internal rotation on the left.     Function/Strength: improvement in tolerance to ankle dorsiflexion range,   Gait: mild shift off the left LE in stance phase, loss of DF range and demonstration of increased hip external rotation through stance    Manual performed: Left ankle mobilization into dorsiflexion, plantarflexion and subtalar eversion; soft tissue release gastrocsoleus. 15 min. Therapeutic exercise performed: Knee to wall stretch, calf raises, seated quad stretch, banded hip mobilization, mini knee bends, ankle distraction, plantarflexion stretch in sitting, lateral and forward heel taps, deep squats, wobble board lateral and forward/backward. 45 minutes. An electronic signature was used to authenticate this note.   -- Maryellen Burdick, PT

## 2023-03-28 ENCOUNTER — OFFICE VISIT (OUTPATIENT)
Dept: ORTHOPEDIC SURGERY | Age: 53
End: 2023-03-28
Payer: COMMERCIAL

## 2023-03-28 DIAGNOSIS — M25.372 ANKLE INSTABILITY, LEFT: ICD-10-CM

## 2023-03-28 DIAGNOSIS — M25.572 ACUTE LEFT ANKLE PAIN: Primary | ICD-10-CM

## 2023-03-28 PROCEDURE — 97140 MANUAL THERAPY 1/> REGIONS: CPT | Performed by: PHYSICAL THERAPIST

## 2023-03-28 PROCEDURE — 97110 THERAPEUTIC EXERCISES: CPT | Performed by: PHYSICAL THERAPIST

## 2023-03-28 NOTE — PROGRESS NOTES
Merritt Tellez (: 1970) is a 47 y.o. male patient here for evaluation of the following chief complaint(s): Ankle Pain       Patient Name: Merritt Tellez  Date:3/28/2023  : 1970  [x]  Patient  Verified  Payor: Marcia Styles / Plan: [employer] HMO/CHOICE PLUS/POS / Product Type: HMO /    In time: 2:10  Out time: 3:15  Total Treatment Time (min): 65  Total Timed Codes (min): 55  1:1 Treatment Time ( only): NA   Visit #: 15 of 60      ASSESSMENT/PLAN:  Below is the assessment and plan developed based on review of pertinent history, physical exam, labs, studies, and medications. 1. Acute left ankle pain  2. Ankle instability, left    Return in about 1 week (around 2023). Patient is about 4 months from his ankle operation on 2022. We are continuing to progress his range of motion, he is making some progress with his gait mechanics with demonstration of less ankle dorsiflexion and hip external rotation through stance phase. He is continuing to walk for longer durations outside of the clinic including when he is at baseball practice. He is performing balance and weight bearing activity including stairs, calf raises, and squats on his own at home. He will follow-up later this week and we will discuss the plan of care at that time. SUBJECTIVE/OBJECTIVE:  HPI    Patient notes improved tolerance to walking and standing, particularly on the baseball field. Physical Exam    Objective:    Range of motion : on left  KTW dorsiflexion- on right- 5 degrees  on left- 16 degrees  Plantarflexion-  not measured, improving  Ankle inversion/eversion- improving  Great toe ext- WFL    **Mild limitation into hip flexion, internal rotation on the left.     Function/Strength: improvement in tolerance to ankle dorsiflexion range, step downs  Gait: mild shift off the left LE in stance phase, loss of DF range and demonstration of increased hip external rotation through stance    Manual performed: Left ankle mobilization into dorsiflexion, plantarflexion and subtalar eversion; soft tissue release gastrocsoleus. 15 min. Therapeutic exercise performed: Knee to wall stretch, calf raises- DL and SL, seated quad stretch, mini knee bends, plantarflexion stretch in sitting, lateral and forward heel taps, deep squats, wobble board lateral and forward/backward. 40 minutes. An electronic signature was used to authenticate this note.   -- Neeta Solis, PT

## 2023-03-31 ENCOUNTER — OFFICE VISIT (OUTPATIENT)
Dept: ORTHOPEDIC SURGERY | Age: 53
End: 2023-03-31

## 2023-03-31 DIAGNOSIS — M25.572 ACUTE LEFT ANKLE PAIN: Primary | ICD-10-CM

## 2023-03-31 DIAGNOSIS — M25.372 ANKLE INSTABILITY, LEFT: ICD-10-CM

## 2023-03-31 NOTE — PROGRESS NOTES
Ofe Jones (: 1970) is a 47 y.o. male patient here for evaluation of the following chief complaint(s): Ankle Pain       Patient Name: Ofe Jones  Date:3/31/2023  : 1970  [x]  Patient  Verified  Payor: Ernesto De Paz / Plan: [employer] HMO/CHOICE PLUS/POS / Product Type: HMO /    In time: 1:20  Out time: 2:30  Total Treatment Time (min): 70  Total Timed Codes (min): 50  1:1 Treatment Time ( only): NA   Visit #:  60      ASSESSMENT/PLAN:  Below is the assessment and plan developed based on review of pertinent history, physical exam, labs, studies, and medications. 1. Acute left ankle pain  2. Ankle instability, left    Return if symptoms worsen or fail to improve. Patient is 4 months from his ankle operation on 2022. He has made progress in his gait mechanics with demonstration of less ankle dorsiflexion and hip external rotation through stance phase. He is continuing to walk for longer durations outside of the clinic including when he is at baseball practice. He is performing balance and weight bearing activity including stairs, calf raises, and squats on his own at home. He continue on his own with a home program, will report back to the clinic as needed in the coming weeks. SUBJECTIVE/OBJECTIVE:  HPI    Patient notes improved tolerance to walking and standing, particularly on the baseball field. He feels comfortable with his progress so far. Feels ready to try exercises on his own. Physical Exam    Objective:    Range of motion : on left  KTW dorsiflexion- on right- 5 degrees  on left- 16 degrees  Plantarflexion-  not measured, improving  Ankle inversion/eversion- improving  Great toe ext- WFL    **Mild limitation into hip flexion, internal rotation on the left.     Function/Strength: improvement in tolerance to ankle dorsiflexion range, step downs  Gait: mild shift off the left LE in stance phase, loss of DF range and demonstration of increased hip external rotation through stance    Manual performed: Left ankle mobilization into dorsiflexion, plantarflexion and subtalar eversion; soft tissue release gastrocsoleus. 15 min. Therapeutic exercise performed: Knee to wall stretch, calf raises- DL and SL, seated quad stretch, tramp plyos, plantarflexion stretch in sitting, lateral and forward heel taps, deep squats, wobble board lateral and forward/backward. 40 minutes. An electronic signature was used to authenticate this note.   -- Elle Arceo, PT

## 2023-06-11 NOTE — PROGRESS NOTES
Group Topic:  DEVORA Education    Date: 6/9/2023  Start Time: 10:00 AM  End Time: 10:55 AM  Facilitators: Ethan Messer LCSW    Focus: DEAR MAN Skills  Number in attendance: 7    Goals: The group will learn the DEAR skills: Describe, Express, Assert, Reinforce, Mindful and Appearing confident, Negotiating, validating, Easy Manner.  The group will learn how to apply these skills towards conflicts in their own lives.  Handouts: None  Method: Group  Attendance: Present  Mood/Affect: Appropriate  Behavior/Socialization: Appropriate to group  Participation: Active  Overall Patient Response to Group: Appropriate to topic  Individual Response to Group: The client shared how he could use these skills with his wife.  Ability to Apply Content to Group: 5     CARMELLA Ayon, PACO, [facility]  6/9/2023   Marycruz Ferrer (: 1970) is a 47 y.o. male patient here for evaluation of the following chief complaint(s): Ankle Pain       Patient Name: Marycruz Ferrer  Date:2023  : 1970  [x]  Patient  Verified  Payor: Ian Ellis / Plan: [employer] HMO/CHOICE PLUS/POS / Product Type: HMO /    In time: 9:00  Out time: 10:05  Total Treatment Time (min): 65  Total Timed Codes (min): 60  1:1 Treatment Time (MC only): NA   Visit #: 6  60      ASSESSMENT/PLAN:  Below is the assessment and plan developed based on review of pertinent history, physical exam, labs, studies, and medications. 1. Acute left ankle pain  2. Ankle instability, left    Return in about 2 days (around 2023). Patient is now over 2 months from operation. Surgery on 2022. We are continuing to progress his range of motion, he is still limited into ankle dorsiflexion range. Today we added plantarflexion mobilization, continued lateral and forward step downs for DF range. Instructed on updated exercises to work on daily, he will follow-up next week and we will continue to progress towards full range and proper gait mechanics. 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 Slow progress. Physical Exam    Objective:    Range of motion : on left  Dorsiflexion- slightly past neutral   Plantarflexion-  50 degrees  Subtalar inversion/Eversion- limited  Great toe ext- limited    Function/Strength: improvement in tolerance to ankle dorsiflexion range, gait mechanics     Manual performed: Left ankle mobilization into dorsiflexion, plantarflexion and subtalar eversion; soft tissue release gastrocsoleus and anterior shin, ankle mob dorsiflexion in 1/2 kneeling. Great toe stretch. 20 min.     Therapeutic exercise performed: Knee to wall stretch, gait practice/cone walks, seated slides into inv/ev/DF, calf stretch, squats, lateral and forward heel taps, ankle distraction, ankle mob DF and PF with band. 40 minutes. Patient finished with Game ready for 10 minutes. An electronic signature was used to authenticate this note.   -- Kathrynn Hamman, PT